# Patient Record
Sex: MALE
[De-identification: names, ages, dates, MRNs, and addresses within clinical notes are randomized per-mention and may not be internally consistent; named-entity substitution may affect disease eponyms.]

---

## 2018-03-31 ENCOUNTER — HOSPITAL ENCOUNTER (EMERGENCY)
Dept: HOSPITAL 25 - UCCORT | Age: 28
Discharge: HOME | End: 2018-03-31
Payer: SELF-PAY
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
## 2018-03-31 DIAGNOSIS — S05.01XA: Primary | ICD-10-CM
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
## 2018-03-31 DIAGNOSIS — F17.210: ICD-10-CM

## 2018-03-31 PROCEDURE — 99203 OFFICE O/P NEW LOW 30 MIN: CPT

## 2018-03-31 PROCEDURE — G0463 HOSPITAL OUTPT CLINIC VISIT: HCPCS

## 2018-03-31 NOTE — UC
Eye Complaint HPI





- HPI Summary


HPI Summary: 


Thinks he may have scratches his right eye 4 days ago---I seemed to get better 

then on Friday morning he rubbed his right eye and had return of pain








- History of Current Complaint


Chief Complaint: UCEye


Stated Complaint: (RT) EYE COMPLAINT


Time Seen by Provider: 03/31/18 12:17


Hx Obtained From: Patient


Onset/Duration: Sudden Onset, Lasting Days - 2, Still Present


Timing: Constant


Severity Initially: Severe


Severity Currently: Severe


Pain Intensity: 9


Pain Scale Used: 0-10 Numeric


Location of Injury: Other - cornea


Character: Throbbing, Foreign Body Sensation


Aggravating Factor(s): Light


Alleviating Factor(s): Nothing


Associated Signs And Symptoms: Positive: Photophobia, Drainage (Clear), Vision 

Impairment Right





- Allergies/Home Medications


Allergies/Adverse Reactions: 


 Allergies











Allergy/AdvReac Type Severity Reaction Status Date / Time


 


No Known Allergies Allergy   Verified 03/31/18 12:11











Home Medications: 


 Home Medications





Ibuprofen TAB* [Motrin TAB* 400 MG] 800 mg PO Q8HR PRN 03/31/18 [History 

Confirmed 03/31/18]











PMH/Surg Hx/FS Hx/Imm Hx


Previously Healthy: Yes





- Surgical History


Surgery Procedure, Year, and Place: 12 eye surgeries 8m-5yo





- Family History


Known Family History: Positive: None





- Social History


Occupation: Employed Full-time


Lives: With Family


Substance Use Comment - Amount & Last Used: Daily


Smoking Status (MU): Heavy Every Day Tobacco Smoker


Amount Used/How Often: 1ppd


Have You Smoked in the Last Year: Yes


Household Exposure Type: Cigarettes


Cessation Counseling: Patient Advised to Stop





Review of Systems


Constitutional: Negative


Skin: Negative


Eyes: Blurred Vision - OD, Drainage - OD, Eye Redness - OD


ENT: Negative


Respiratory: Negative


Cardiovascular: Negative


Gastrointestinal: Negative


Genitourinary: Negative


Motor: Negative


Neurovascular: Negative


Musculoskeletal: Negative


Neurological: Negative


Psychological: Negative


Is Patient Immunocompromised?: No


All Other Systems Reviewed And Are Negative: Yes





Physical Exam


Triage Information Reviewed: Yes


Appearance: Well-Appearing, Well-Nourished, Pain Distress


Vital Signs: 


 Initial Vital Signs











Temp  98.2 F   03/31/18 12:01


 


Pulse  76   03/31/18 12:01


 


Resp  16   03/31/18 12:01


 


BP  129/85   03/31/18 12:01


 


Pulse Ox  97   03/31/18 12:01











Vital Signs Reviewed: Yes


Eye Exam: Normal


Eyes: Positive: Conjunctiva Inflamed - right, Discharge - clear


ENT Exam: Normal


ENT: Positive: Normal ENT inspection, Hearing grossly normal, Nasal drainage.  

Negative: Trismus, Muffled voice, Hoarse voice


Neck exam: Normal


Neck: Positive: Supple, Nontender, No Lymphadenopathy


Respiratory Exam: Normal


Respiratory: Positive: Chest non-tender, No respiratory distress


Cardiovascular Exam: Normal


Cardiovascular: Positive: Pulses Normal, Brisk Capillary Refill


Musculoskeletal Exam: Normal


Musculoskeletal: Positive: Strength Intact, ROM Intact, No Edema


Neurological Exam: Normal


Neurological: Positive: Alert, Muscle Tone Normal


Psychological Exam: Normal


Psychological: Positive: Normal Response To Family


Skin Exam: Normal





Re-Evaluation





- Re-Evaluation


  ** First Eval


Change: Improved - ful-crystal stain --abrasion at 6 o'clock on cornea.





Eye Complaint Course/Dx





- Course


Course Of Treatment: Ibuprofen, cipro drops, follow with optho.





- Differential Dx/Diagnosis


Provider Diagnoses: OD Corneal abrasion, nicotine dependent





Discharge





- Sign-Out/Discharge


Documenting (check all that apply): Discharge





- Discharge Plan


Condition: Stable


Disposition: HOME


Prescriptions: 


Ciprofloxacin 0.3% OPTH.SOL* [Cipro 0.3% Opth*] 1 drop RIGHT EYE Q2H #1 btl


Ibuprofen TAB* [Motrin TAB* 800 MG] 800 mg PO TID PRN #40 tab


 PRN Reason: pain


Patient Education Materials:  Corneal Abrasion (DC), How to Use Eye Drops (ED)


Referrals: 


Brandie Camarillo MD [Medical Doctor] - 5 Days





- Billing Disposition and Condition


Condition: STABLE


Disposition: HOME